# Patient Record
Sex: MALE | Race: WHITE | NOT HISPANIC OR LATINO
[De-identification: names, ages, dates, MRNs, and addresses within clinical notes are randomized per-mention and may not be internally consistent; named-entity substitution may affect disease eponyms.]

---

## 2018-07-17 ENCOUNTER — HOSPITAL (OUTPATIENT)
Dept: OTHER | Age: 63
End: 2018-07-17
Attending: OPHTHALMOLOGY

## 2024-08-09 PROCEDURE — 88305 TISSUE EXAM BY PATHOLOGIST: CPT | Performed by: DERMATOLOGY

## 2024-08-12 ENCOUNTER — LAB REQUISITION (OUTPATIENT)
Dept: LAB | Facility: HOSPITAL | Age: 69
End: 2024-08-12
Payer: MEDICARE

## 2024-08-12 DIAGNOSIS — C44.41 BASAL CELL CARCINOMA OF SKIN OF SCALP AND NECK: ICD-10-CM

## 2024-09-16 ENCOUNTER — TELEPHONE (OUTPATIENT)
Dept: FAMILY MEDICINE CLINIC | Facility: CLINIC | Age: 69
End: 2024-09-16

## 2024-09-16 NOTE — TELEPHONE ENCOUNTER
L4-5 Hemilaminotomy/Foraminotomy on 10/07/24 with Dr. Engel @ [facility]    H&P- Completed 09/17/2024 with Dr. Dagoberto Monae   Labs- Albumin (5.2), A/G ratio (2.1), MRSA neg, all other labs WNL  EKG- Sinus riri (56bpm) otherwise WNL  X-ray- Mild elevation of left hemidiaphragm. There is some mild linear opacity in the left lung base likely scarring vs atelectasis. Probable subcentimeter right upper lobe calcified granuloma. No pleural effusion or pneumothorax. No acute cardiopulmonary disease.     Cardiology- Dr. Almaguer @ [facility]  P- [phone]  F- [phone]  APPT 08/29/24  Cardiac Clx scanned into Media 09/18/24:  \"EKG is normal, should be good for surgery. Patient is stable cardiac wise for surgery.\"

## 2024-09-17 ENCOUNTER — EKG ENCOUNTER (OUTPATIENT)
Dept: LAB | Facility: HOSPITAL | Age: 69
End: 2024-09-17
Attending: FAMILY MEDICINE
Payer: MEDICARE

## 2024-09-17 ENCOUNTER — HOSPITAL ENCOUNTER (OUTPATIENT)
Dept: GENERAL RADIOLOGY | Facility: HOSPITAL | Age: 69
Discharge: HOME | End: 2024-09-17
Attending: FAMILY MEDICINE
Payer: MEDICARE

## 2024-09-17 ENCOUNTER — OFFICE VISIT (OUTPATIENT)
Dept: FAMILY MEDICINE CLINIC | Facility: CLINIC | Age: 69
End: 2024-09-17
Payer: MEDICARE

## 2024-09-17 VITALS
RESPIRATION RATE: 16 BRPM | TEMPERATURE: 97 F | SYSTOLIC BLOOD PRESSURE: 122 MMHG | BODY MASS INDEX: 32.58 KG/M2 | DIASTOLIC BLOOD PRESSURE: 82 MMHG | HEART RATE: 63 BPM | WEIGHT: 225 LBS | OXYGEN SATURATION: 98 % | HEIGHT: 69.5 IN

## 2024-09-17 DIAGNOSIS — K29.50 OTHER CHRONIC GASTRITIS WITHOUT HEMORRHAGE: ICD-10-CM

## 2024-09-17 DIAGNOSIS — Z01.812 PRE-OPERATIVE LABORATORY EXAMINATION: ICD-10-CM

## 2024-09-17 DIAGNOSIS — R06.02 SHORTNESS OF BREATH: ICD-10-CM

## 2024-09-17 DIAGNOSIS — N48.6 PEYRONIE'S DISEASE: ICD-10-CM

## 2024-09-17 DIAGNOSIS — E78.2 MIXED HYPERLIPIDEMIA: ICD-10-CM

## 2024-09-17 DIAGNOSIS — G47.33 OSA ON CPAP: ICD-10-CM

## 2024-09-17 DIAGNOSIS — E29.1 HYPOGONADISM IN MALE: ICD-10-CM

## 2024-09-17 DIAGNOSIS — J30.9 ALLERGIC SINUSITIS: ICD-10-CM

## 2024-09-17 DIAGNOSIS — I10 PRIMARY HYPERTENSION: ICD-10-CM

## 2024-09-17 DIAGNOSIS — R73.01 ELEVATED FASTING BLOOD SUGAR: ICD-10-CM

## 2024-09-17 DIAGNOSIS — Z01.818 PREOP EXAMINATION: ICD-10-CM

## 2024-09-17 DIAGNOSIS — I25.10 ATHEROSCLEROSIS OF NATIVE CORONARY ARTERY OF NATIVE HEART WITHOUT ANGINA PECTORIS: ICD-10-CM

## 2024-09-17 DIAGNOSIS — E03.9 ACQUIRED HYPOTHYROIDISM: ICD-10-CM

## 2024-09-17 DIAGNOSIS — M48.062 NEUROGENIC CLAUDICATION DUE TO LUMBAR SPINAL STENOSIS: Primary | ICD-10-CM

## 2024-09-17 LAB
ALBUMIN SERPL-MCNC: 5.2 G/DL (ref 3.2–4.8)
ALBUMIN/GLOB SERPL: 2.1 {RATIO} (ref 1–2)
ALP LIVER SERPL-CCNC: 61 U/L
ALT SERPL-CCNC: 27 U/L
ANION GAP SERPL CALC-SCNC: 5 MMOL/L (ref 0–18)
ANTIBODY SCREEN: NEGATIVE
APTT PPP: 27.5 SECONDS (ref 23–36)
AST SERPL-CCNC: 27 U/L (ref ?–34)
ATRIAL RATE: 56 BPM
BASOPHILS # BLD AUTO: 0.05 X10(3) UL (ref 0–0.2)
BASOPHILS NFR BLD AUTO: 1.1 %
BILIRUB SERPL-MCNC: 1 MG/DL (ref 0.2–1.1)
BILIRUB UR QL: NEGATIVE
BUN BLD-MCNC: 11 MG/DL (ref 9–23)
BUN/CREAT SERPL: 10.8 (ref 10–20)
CALCIUM BLD-MCNC: 10.3 MG/DL (ref 8.7–10.4)
CHLORIDE SERPL-SCNC: 105 MMOL/L (ref 98–112)
CLARITY UR: CLEAR
CO2 SERPL-SCNC: 29 MMOL/L (ref 21–32)
CREAT BLD-MCNC: 1.02 MG/DL
DEPRECATED RDW RBC AUTO: 45.7 FL (ref 35.1–46.3)
EGFRCR SERPLBLD CKD-EPI 2021: 79 ML/MIN/1.73M2 (ref 60–?)
EOSINOPHIL # BLD AUTO: 0.13 X10(3) UL (ref 0–0.7)
EOSINOPHIL NFR BLD AUTO: 2.8 %
ERYTHROCYTE [DISTWIDTH] IN BLOOD BY AUTOMATED COUNT: 13.9 % (ref 11–15)
EST. AVERAGE GLUCOSE BLD GHB EST-MCNC: 114 MG/DL (ref 68–126)
FASTING STATUS PATIENT QL REPORTED: YES
GLOBULIN PLAS-MCNC: 2.5 G/DL (ref 2–3.5)
GLUCOSE BLD-MCNC: 96 MG/DL (ref 70–99)
GLUCOSE UR-MCNC: NORMAL MG/DL
HBA1C MFR BLD: 5.6 % (ref ?–5.7)
HCT VFR BLD AUTO: 47.1 %
HGB BLD-MCNC: 15.7 G/DL
HGB UR QL STRIP.AUTO: NEGATIVE
IMM GRANULOCYTES # BLD AUTO: 0.01 X10(3) UL (ref 0–1)
IMM GRANULOCYTES NFR BLD: 0.2 %
INR BLD: 0.96 (ref 0.8–1.2)
KETONES UR-MCNC: NEGATIVE MG/DL
LEUKOCYTE ESTERASE UR QL STRIP.AUTO: NEGATIVE
LYMPHOCYTES # BLD AUTO: 1.32 X10(3) UL (ref 1–4)
LYMPHOCYTES NFR BLD AUTO: 28.9 %
MCH RBC QN AUTO: 29.9 PG (ref 26–34)
MCHC RBC AUTO-ENTMCNC: 33.3 G/DL (ref 31–37)
MCV RBC AUTO: 89.7 FL
MONOCYTES # BLD AUTO: 0.55 X10(3) UL (ref 0.1–1)
MONOCYTES NFR BLD AUTO: 12 %
NEUTROPHILS # BLD AUTO: 2.51 X10 (3) UL (ref 1.5–7.7)
NEUTROPHILS # BLD AUTO: 2.51 X10(3) UL (ref 1.5–7.7)
NEUTROPHILS NFR BLD AUTO: 55 %
NITRITE UR QL STRIP.AUTO: NEGATIVE
OSMOLALITY SERPL CALC.SUM OF ELEC: 287 MOSM/KG (ref 275–295)
P AXIS: 39 DEGREES
P-R INTERVAL: 154 MS
PH UR: 7 [PH] (ref 5–8)
PLATELET # BLD AUTO: 255 10(3)UL (ref 150–450)
POTASSIUM SERPL-SCNC: 4.5 MMOL/L (ref 3.5–5.1)
PROT SERPL-MCNC: 7.7 G/DL (ref 5.7–8.2)
PROT UR-MCNC: NEGATIVE MG/DL
PROTHROMBIN TIME: 13.4 SECONDS (ref 11.6–14.8)
Q-T INTERVAL: 396 MS
QRS DURATION: 94 MS
QTC CALCULATION (BEZET): 382 MS
R AXIS: 17 DEGREES
RBC # BLD AUTO: 5.25 X10(6)UL
RH BLOOD TYPE: POSITIVE
SODIUM SERPL-SCNC: 139 MMOL/L (ref 136–145)
SP GR UR STRIP: 1.01 (ref 1–1.03)
T AXIS: 28 DEGREES
UROBILINOGEN UR STRIP-ACNC: NORMAL
VENTRICULAR RATE: 56 BPM
WBC # BLD AUTO: 4.6 X10(3) UL (ref 4–11)

## 2024-09-17 PROCEDURE — 86900 BLOOD TYPING SEROLOGIC ABO: CPT | Performed by: FAMILY MEDICINE

## 2024-09-17 PROCEDURE — 87081 CULTURE SCREEN ONLY: CPT

## 2024-09-17 PROCEDURE — 99204 OFFICE O/P NEW MOD 45 MIN: CPT | Performed by: FAMILY MEDICINE

## 2024-09-17 PROCEDURE — 86850 RBC ANTIBODY SCREEN: CPT | Performed by: FAMILY MEDICINE

## 2024-09-17 PROCEDURE — 81003 URINALYSIS AUTO W/O SCOPE: CPT

## 2024-09-17 PROCEDURE — 93005 ELECTROCARDIOGRAM TRACING: CPT

## 2024-09-17 PROCEDURE — 93010 ELECTROCARDIOGRAM REPORT: CPT | Performed by: STUDENT IN AN ORGANIZED HEALTH CARE EDUCATION/TRAINING PROGRAM

## 2024-09-17 PROCEDURE — 71046 X-RAY EXAM CHEST 2 VIEWS: CPT | Performed by: FAMILY MEDICINE

## 2024-09-17 PROCEDURE — 99499 UNLISTED E&M SERVICE: CPT | Performed by: FAMILY MEDICINE

## 2024-09-17 PROCEDURE — 85730 THROMBOPLASTIN TIME PARTIAL: CPT

## 2024-09-17 PROCEDURE — 86901 BLOOD TYPING SEROLOGIC RH(D): CPT | Performed by: FAMILY MEDICINE

## 2024-09-17 PROCEDURE — 85610 PROTHROMBIN TIME: CPT

## 2024-09-17 PROCEDURE — 80053 COMPREHEN METABOLIC PANEL: CPT

## 2024-09-17 PROCEDURE — 83036 HEMOGLOBIN GLYCOSYLATED A1C: CPT

## 2024-09-17 PROCEDURE — 36415 COLL VENOUS BLD VENIPUNCTURE: CPT

## 2024-09-17 PROCEDURE — 85025 COMPLETE CBC W/AUTO DIFF WBC: CPT

## 2024-09-17 RX ORDER — SILDENAFIL CITRATE 20 MG/1
20 TABLET ORAL AS NEEDED
COMMUNITY

## 2024-09-17 RX ORDER — IBUPROFEN 200 MG
400 TABLET ORAL EVERY 6 HOURS PRN
COMMUNITY

## 2024-09-17 RX ORDER — CARVEDILOL 3.12 MG/1
3.12 TABLET ORAL 2 TIMES DAILY WITH MEALS
COMMUNITY

## 2024-09-17 RX ORDER — EZETIMIBE 10 MG/1
10 TABLET ORAL NIGHTLY
COMMUNITY

## 2024-09-17 RX ORDER — PENTOXIFYLLINE 400 MG/1
400 TABLET, EXTENDED RELEASE ORAL 2 TIMES DAILY
COMMUNITY

## 2024-09-17 RX ORDER — LISINOPRIL 10 MG/1
10 TABLET ORAL DAILY
COMMUNITY

## 2024-09-17 RX ORDER — TESTOSTERONE GEL, 1% 10 MG/G
4 GEL TRANSDERMAL DAILY
COMMUNITY

## 2024-09-17 RX ORDER — LEVOTHYROXINE SODIUM 75 UG/1
75 TABLET ORAL
COMMUNITY

## 2024-09-17 RX ORDER — FAMOTIDINE 40 MG/1
40 TABLET, FILM COATED ORAL DAILY
COMMUNITY

## 2024-09-17 RX ORDER — ROSUVASTATIN CALCIUM 40 MG/1
40 TABLET, COATED ORAL DAILY
COMMUNITY

## 2024-09-17 RX ORDER — FLUTICASONE PROPIONATE 50 MCG
1 SPRAY, SUSPENSION (ML) NASAL DAILY
COMMUNITY

## 2024-09-17 NOTE — H&P
[facility] PRE-OP CLINIC [place]    PRE-OP NOTE    HPI:   I have been consulted by Dr. Engel to see Anthony Cantu 68 year old male for a preoperative evaluation and medical clearance. He has a long history of worsening severe degenerative arthritis and lumbar spinal stenosis . Patient is to have a L4-L5 hemilaminotomy and foraminotomy  by Dr. Engel  on 10/7/2024.     Pt suffers significant pain and loss of function.     He has no cardiopulmonary symptoms.      He has no history of DVT. Denies tobacco use.       Family History   Problem Relation Age of Onset    Other (Other) Brother         MI      No current outpatient medications on file.     Past Medical History:    Acute lower GI hemorrhage    post colonoscopy and polypectomy, recieved 2 blood trasfusions- no reaction    Allergic rhinitis    C. difficile colitis    CAD (coronary artery disease)    s/p angioplasty and stent    COVID    Essential hypertension    Gastritis    Hyperlipidemia    Hypogonadism male    Hypothyroidism    Lumbar stenosis    Peyronie's syndrome    Rheumatic fever    no valvular heart involment    Sleep apnea    uses CPAP     Past Surgical History:   Procedure Laterality Date    Angioplasty (coronary)      with 2 stents placed 2005, 2008    Cataract Bilateral     Colonoscopy  2014    with polypectomy    Lumbar transforaminal epidural injection (eeh)      2019, 2024    Repair retinal detach, cplx       Social History     Socioeconomic History    Marital status:      Spouse name: Not on file    Number of children: Not on file    Years of education: Not on file    Highest education level: Not on file   Occupational History    Not on file   Tobacco Use    Smoking status: Never     Passive exposure: Past    Smokeless tobacco: Never   Vaping Use    Vaping status: Never Used   Substance and Sexual Activity    Alcohol use: Yes     Comment: 10 drinks per week    Drug use: Never    Sexual activity: Not on file   Other Topics Concern     Not on file   Social History Narrative    Not on file     Social Determinants of Health     Financial Resource Strain: Not on file   Food Insecurity: Not on file   Transportation Needs: Not on file   Physical Activity: Not on file   Stress: Not on file   Social Connections: Not on file   Housing Stability: Not on file       REVIEW OF SYSTEMS:   CONSTITUTIONAL:  Denies unusual weight gain/loss, fever, chills  EENT:  Eyes:  Denies eye pain, visual loss, blurred vision, double vision. Ears, Nose, Throat:  Denies congestion, runny nose or sore throat.  INTEGUMENTARY:  Denies rashes, itching, skin lesion,   CARDIOVASCULAR:  Denies DVT. Denies chest pain, palpitations, edema, dyspnea  RESPIRATORY: Denies  CHELY ,Denies shortness of breath, wheezing, cough  GASTROINTESTINAL:  Denies abdominal pain, nausea, vomiting, constipation, diarrhea, or blood in stool.  MUSCULOSKELETAL: severe pain to his low back radiating to his right leg as noted above  NEUROLOGICAL:  Denies headache, seizures, dizziness, syncope, paralysis, ataxia,  HEMATOLOGIC:  Denies anemia, bleeding or bruising.  LYMPHATICS:  Denies enlarged nodes   PSYCHIATRIC:  Denies depression or anxiety.  ENDOCRINOLOGIC: DM 2 NO,   ALLERGIES:  Denies allergic response, history of asthma, hives,     EXAM:   /82 (BP Location: Left arm)   Pulse 63   Temp 97 °F (36.1 °C) (Temporal)   Resp 16   Ht 5' 9.5\" (1.765 m)   Wt 225 lb (102.1 kg)   SpO2 98%   BMI 32.75 kg/m²  Estimated body mass index is 32.75 kg/m² as calculated from the following:    Height as of this encounter: 5' 9.5\" (1.765 m).    Weight as of this encounter: 225 lb (102.1 kg).   Vital signs reviewed.Appears stated age, well groomed.  Physical Exam:  GEN:  Patient is alert, awake and oriented, well developed, well nourished, no apparent distress.  HEENT:  Head:  Normocephalic, atraumatic Nose: patent, no nasal discharge  NECK: Supple, no CLAD, no carotid bruit, no thyromegaly.  SKIN: No rashes, no skin  lesion, no bruising, good turgor.  HEART:  Regular rate and rhythm, no murmurs, rubs or gallops.  LUNGS: Clear to auscultation bilterally, no rales/rhonchi/wheezing.  CHEST: No tenderness.  ABDOMEN:  Soft, nondistended, nontender,  no masses, no hepatosplenomegaly.  BACK: low lumbar spinal tenderness ,   EXTREMITIES:  No edema, no cyanosis, no clubbing,   NEURO:  No focal deficit, speech fluent, he has an antalgic  gait, strength and tone, sensory intact      No results found for: \"WBC\", \"HGB\", \"HCT\", \"PLT\", \"CREATSERUM\", \"BUN\", \"NA\", \"K\", \"CL\", \"CO2\", \"GLU\", \"CA\", \"ALB\", \"ALKPHO\", \"BILT\", \"TP\", \"AST\", \"ALT\", \"PTT\", \"INR\", \"PT\", \"T4F\", \"TSH\", \"TSHREFLEX\", \"ANGÉLICA\", \"LIP\", \"GGT\", \"PSA\", \"DDIMER\", \"ESRML\", \"ESRPF\", \"CRP\", \"BNP\", \"MG\", \"PHOS\", \"TROP\", \"CK\", \"CKMB\", \"RANULFO\", \"RPR\", \"B12\", \"ETOH\", \"POCGLU\"    No results found.          ASSESSMENT AND PLAN:   Anthony Cantu is a 68 year old male, with a hx of severe degenerative arthritis and lumbar spinal stenosis  who presents for a pre-operative physical exam. Patient is to have a L4-L5 hemilamotomy and foraminotomy  by Dr. Engel  on 10/7/2024.      ICD-10-CM    1. Neurogenic claudication due to lumbar spinal stenosis  M48.062       2. Allergic sinusitis  J30.9       3. Atherosclerosis of native coronary artery of native heart without angina pectoris  I25.10       4. BMI 32.0-32.9,adult  Z68.32       5. Primary hypertension  I10       6. Other chronic gastritis without hemorrhage  K29.50       7. Acquired hypothyroidism  E03.9       8. Mixed hyperlipidemia  E78.2       9. Hypogonadism in male  E29.1       10. Peyronie's disease  N48.6       11. CHELY on CPAP  G47.33       12. Shortness of breath  R06.02 CBC With Differential With Platelet     Comp Metabolic Panel (14)     EKG 12 Lead     MSSA and MRSA Culture Screen     Hemoglobin A1C     Prothrombin Time (PT)     PTT, Activated     Type and screen     Urinalysis with Culture Reflex     XR CHEST PA + LAT CHEST  (CPT=71046)      13. Elevated fasting blood sugar  R73.01 CBC With Differential With Platelet     Comp Metabolic Panel (14)     EKG 12 Lead     MSSA and MRSA Culture Screen     Hemoglobin A1C     Prothrombin Time (PT)     PTT, Activated     Type and screen     Urinalysis with Culture Reflex     XR CHEST PA + LAT CHEST (CPT=71046)      14. Preop examination  Z01.818 CBC With Differential With Platelet     Comp Metabolic Panel (14)     EKG 12 Lead     MSSA and MRSA Culture Screen     Hemoglobin A1C     Prothrombin Time (PT)     PTT, Activated     Type and screen     Urinalysis with Culture Reflex     XR CHEST PA + LAT CHEST (CPT=71046)      15. Pre-operative laboratory examination  Z01.812 CBC With Differential With Platelet     Comp Metabolic Panel (14)     EKG 12 Lead     MSSA and MRSA Culture Screen     Hemoglobin A1C     Prothrombin Time (PT)     PTT, Activated     Type and screen     Urinalysis with Culture Reflex     XR CHEST PA + LAT CHEST (CPT=71046)         ECG and labs are pending review     Preoperative Risk Stratification: There are no decompensated medical conditions. ASA classification 3    Medical history:  High risk surgery (vascular, thoracic, intra-peritoneal): No  CAD: YES  CHF:   Stroke: No  DM on insulin: No  Serum Creatinine >2 mg/dl: No  Patient has an RCRI score that is moderate risk and is at moderate risk for major cardiac event in the perioperative period.     Patient is medically optimized and has an acceptable risk of surgery and may proceed with surgery as planned.     PLAN:    Await Cardiology recommendations     Patient to discontinue medications and supplements with anticoagulation properties as per instruction.        Postoperative Recommendations:    Anticoagulation / DVT prophylaxis: SCDs, early ambulation.   GI protection: Protonix  Incentive Spirometry   Telemetry as needed  CPAP/O2 as needed   DM: QID glucoscans and sliding scale insulin as needed   Renal protection: (hydration /  NSAID and ACE/ARB avoidance)   Cognitive protection: (minimize narcotics, benzodiazepines, scopolamine)     Pain management and Physical therapy as per Orthopedic service.   Home Health as needed    Thank you for the opportunity to care for your patient and to assist in managing the postoperative course.        Dagoberto Monae DO  9/17/2024  10:24 AM

## 2024-09-17 NOTE — TELEPHONE ENCOUNTER
Dr. Monae, please review:    Labs- Albumin (5.2), A/G ratio (2.1), MRSA neg, all other labs WNL    EKG- Sinus riri (56bpm) otherwise WNL    X-ray- Mild elevation of left hemidiaphragm. There is some mild linear opacity in the left lung base likely scarring vs atelectasis. Probable subcentimeter right upper lobe calcified granuloma. No pleural effusion or pneumothorax. No acute cardiopulmonary disease.     Cardiology- Dr. Almaguer @ [facility]  Cardiac Clx scanned into [facility] 09/18/24:  \"EKG is normal, should be good for surgery. Patient is stable cardiac wise for surgery.\"    OK for surgery?

## 2024-10-01 PROBLEM — N48.6 PEYRONIE DISEASE: Status: ACTIVE | Noted: 2019-06-13

## 2024-10-01 PROBLEM — M48.061 LUMBAR STENOSIS: Status: ACTIVE | Noted: 2024-10-01

## 2024-10-01 PROBLEM — E78.5 HYPERLIPIDEMIA: Status: ACTIVE | Noted: 2024-10-01

## 2024-10-04 NOTE — DISCHARGE INSTRUCTIONS
ALONA GONZALEZ M.D    LUMBAR DECOMPRESSION DISCHARGE INSTRUCTIONS  Hold vitamins/supplements/herbals for one week post op  GENERAL  Swelling is commonly experienced by patients after surgery around the surgical incision.   You may apply an icepack to the incision. Make sure to place a barrier between your skin & the ice and keep your dressing dry.  MEDICATIONS  Your post-op pain medications were sent to your pharmacy prior to surgery.  Begin taking your pain medications once you get home, as prescribed.   Please allow 48 hours for refills. Call our office at [phone] to request a refill.  Caution: Narcotics are habit forming and have multiple side effects. Begin to taper your use as soon as you are able.  If you were on a blood thinner before surgery, you may resume 3 days after surgery (unless otherwise directed by your prescribing MD). Do not take anti-inflammatories if you are on a blood thinner).  You may resume anti-inflammatories (i.e. Advil, Aleve, Motrin) 1 week after surgery (do not take if on blood thinner).  SURGICAL DRESSING  Remove your dressings(s) three (3) days after surgery.   Do not apply any ointments to the surgical incision. All the sutures we use are absorbed underneath your skin. Skin glue is applied to the superficial layers to keep the skin edges approximated.  You may shower after 3 days, allowing soap and water to run over the incision & pat dry. Do not submerge your incision(s) in water.  ACTIVITY/ SLEEPING  Avoid excessive bending, lifting & twisting at the waist. You may twist enough to use the bathroom for hygiene purposes.   Do not lift more than 10-15 pounds for the first 6 weeks after surgery.  When sleeping, keep your spine in a neutral position. Avoid twisting when getting into or out of your sleeping position.     DIET  Stay hydrated & eat plenty of fiber-rich foods, fresh fruits, and vegetables. Narcotics are constipating!  Make sure to take the stool softener  (Senakot-S) twice daily to avoid constipation.  If the Senakot is not effective in producing a bowel movement, obtain a laxative such as Magnesium Citrate, MiraLAX, or Dulcolax at your local pharmacy. Drinking prune juice can often be helpful, as well.  CONCERNING FINDINGS  Call our office if you have any concerns  Concerning findings: Excessive redness of the incision(s), drainage for more than 4 days after surgery, fever of more than 101.5° F  Business Hours (8-5 pm): [phone]  After-hours: Contact the on-call orthopedic physician at:  [phone], option [phone], and ask for the orthopedic spine fellow on call  FOLLOW-UP  You will be seen by one of Dr Engel' PA's or NP's for your first post-op visit, 2-3 weeks after surgery.  If your post-op visits are not already scheduled, please call our office to schedule.  Administrative needs: [phone]  Clinical questions (business hours only): [phone]    **REFER TO THE FAQ PACKET FOR MORE DETAILED INFORMATION ABOUT YOUR PROCEDURE, RECOVERY, AND COMMONLY ASKED QUESTIONS/ANSWERS**    POST-OP MEDICATION SCHEDULE 24-hour regime example  OXYCODONE prescription  Medication 6 pm 8 pm 10 pm 12 am 2 am 4 am 6am 8 am 10 am 12 pm 2 pm 4 pm   Oxycodone 5 mg 1-2 tabs  1-2 tabs  1-2 tabs  1-2 tabs  1-2 tabs  1-2 tabs    Tylenol 500 mg  2 tabs    2 tabs    2 tabs     Tizanidine 4 mg 1 tab    1 tab    1 tab      Senokot 8.6 mg 1 tab      1 tab        Cefadroxil 500 mg  1 tab      1 tab       **Tylenol is OTC & optional  Oxycodone 5 mg OR Norco 5/325 mg  Strong pain medication. Take 1-2 tablets by mouth every 4-6 hours after surgery as needed for pain. **Begin with 1 tablet and increase to 2 tablets if pain not controlled.  This medication is to be taken as needed. Narcotics can make you constipated!  Tizanidine 4 mg  Muscle relaxer. Take 1 tablet every 8 hours as needed for muscle spasms. This medication may make you very tired.  Cefadroxil 500 mg  Antibiotic that must be taken  for 5 days after surgery.   Senokot 8.6 mg  Stool softener. Take 1 tablet twice daily (every 12 hours) while taking narcotic pain medication. This medication is to be taken as needed.  **PLEASE NOTE: This is an example for your convenience. As your pain improves, you should wean off narcotic pain medications.  Narcotic pain medications will not be prescribed longer than 12 weeks.      HOME INSTRUCTIONS  AMBSURG HOME CARE INSTRUCTIONS: POST-OP ANESTHESIA  The medication that you received for sedation or general anesthesia can last up to 24 hours. Your judgment and reflexes may be altered, even if you feel like your normal self.      We Recommend:   Do not drive any motor vehicle or bicycle   Avoid mowing the lawn, playing sports, or working with power tools/applicances (power saws, electric knives or mixers)   That you have someone stay with you on your first night home   Do not drink alcohol or take sleeping pills or tranquilizers   Do not sign legal documents within 24 hours of your procedure   If you had a nerve block for your surgery, take extra care not to put any pressure on your arm or hand for 24 hours    It is normal:  For you to have a sore throat if you had a breathing tube during surgery (while you were asleep!). The sore throat should get better within 48 hours. You can gargle with warm salt water (1/2 tsp in 4 oz warm water) or use a throat lozenge for comfort  To feel muscle aches or soreness especially in the abdomen, chest or neck. The achy feeling should go away in the next 24 hours  To feel weak, sleepy or \"wiped out\". Your should start feeling better in the next 24 hours.   To experience mild discomforts such as sore lip or tongue, headache, cramps, gas pains or a bloated feeling in your abdomen.   To experience mild back pain or soreness for a day or two if you had spinal or epidural anesthesia.   If you had laparoscopic surgery, to feel shoulder pain or discomfort on the day of surgery.   For  some patients to have nausea after surgery/anesthesia    If you feel nausea or experience vomiting:   Try to move around less.   Eat less than usual or drink only liquids until the next morning   Nausea should resolve in about 24 hours    If you have a problem when you are at home:    Call your surgeons office   Discharge Instructions: After Your Surgery  You’ve just had surgery. During surgery, you were given medicine called anesthesia to keep you relaxed and free of pain. After surgery, you may have some pain or nausea. This is common. Here are some tips for feeling better and getting well after surgery.   Going home  Your healthcare provider will show you how to take care of yourself when you go home. They'll also answer your questions. Have an adult family member or friend drive you home. For the first 24 hours after your surgery:   Don't drive or use heavy equipment.  Don't make important decisions or sign legal papers.  Take medicines as directed.  Don't drink alcohol.  Have someone stay with you, if needed. They can watch for problems and help keep you safe.  Be sure to go to all follow-up visits with your healthcare provider. And rest after your surgery for as long as your provider tells you to.   Coping with pain  If you have pain after surgery, pain medicine will help you feel better. Take it as directed, before pain becomes severe. Also, ask your healthcare provider or pharmacist about other ways to control pain. This might be with heat, ice, or relaxation. And follow any other instructions your surgeon or nurse gives you.      Stay on schedule with your medicine.     Tips for taking pain medicine  To get the best relief possible, remember these points:   Pain medicines can upset your stomach. Taking them with a little food may help.  Most pain relievers taken by mouth need at least 20 to 30 minutes to start to work.  Don't wait till your pain becomes severe before you take your medicine. Try to time your  medicine so that you can take it before starting an activity. This might be before you get dressed, go for a walk, or sit down for dinner.  Constipation is a common side effect of some pain medicines. Call your healthcare provider before taking any medicines such as laxatives or stool softeners to help ease constipation. Also ask if you should skip any foods. Drinking lots of fluids and eating foods such as fruits and vegetables that are high in fiber can also help. Remember, don't take laxatives unless your surgeon has prescribed them.  Drinking alcohol and taking pain medicine can cause dizziness and slow your breathing. It can even be deadly. Don't drink alcohol while taking pain medicine.  Pain medicine can make you react more slowly to things. Don't drive or run machinery while taking pain medicine.  Your healthcare provider may tell you to take acetaminophen to help ease your pain. Ask them how much you're supposed to take each day. Acetaminophen or other pain relievers may interact with your prescription medicines or other over-the-counter (OTC) medicines. Some prescription medicines have acetaminophen and other ingredients in them. Using both prescription and OTC acetaminophen for pain can cause you to accidentally overdose. Read the labels on your OTC medicines with care. This will help you to clearly know the list of ingredients, how much to take, and any warnings. It may also help you not take too much acetaminophen. If you have questions or don't understand the information, ask your pharmacist or healthcare provider to explain it to you before you take the OTC medicine.   Managing nausea  Some people have an upset stomach (nausea) after surgery. This is often because of anesthesia, pain, or pain medicine, less movement of food in the stomach, or the stress of surgery. These tips will help you handle nausea and eat healthy foods as you get better. If you were on a special food plan before surgery, ask  your healthcare provider if you should follow it while you get better. Check with your provider on how your eating should progress. It may depend on the surgery you had. These general tips may help:   Don't push yourself to eat. Your body will tell you when to eat and how much.  Start off with clear liquids and soup. They're easier to digest.  Next try semi-solid foods as you feel ready. These include mashed potatoes, applesauce, and gelatin.  Slowly move to solid foods. Don’t eat fatty, rich, or spicy foods at first.  Don't force yourself to have 3 large meals a day. Instead eat smaller amounts more often.  Take pain medicines with a small amount of solid food, such as crackers or toast. This helps prevent nausea.  When to call your healthcare provider  Call your healthcare provider right away if you have any of these:   You still have too much pain, or the pain gets worse, after taking the medicine. The medicine may not be strong enough. Or there may be a complication from the surgery.  You feel too sleepy, dizzy, or groggy. The medicine may be too strong.  Side effects such as nausea or vomiting. Your healthcare provider may advise taking other medicines to .  Skin changes such as rash, itching, or hives. This may mean you have an allergic reaction. Your provider may advise taking other medicines.  The incision looks different (for instance, part of it opens up).  Bleeding or fluid leaking from the incision site, and weren't told to expect that.  Fever of 100.4°F (38°C) or higher, or as directed by your provider.  Call 911  Call 911 right away if you have:   Trouble breathing  Facial swelling    If you have obstructive sleep apnea   You were given anesthesia medicine during surgery to keep you comfortable and free of pain. After surgery, you may have more apnea spells because of this medicine and other medicines you were given. The spells may last longer than normal.    At home:  Keep using the continuous  positive airway pressure (CPAP) device when you sleep. Unless your healthcare provider tells you not to, use it when you sleep, day or night. CPAP is a common device used to treat obstructive sleep apnea.  Talk with your provider before taking any pain medicine, muscle relaxants, or sedatives. Your provider will tell you about the possible dangers of taking these medicines.  Contact your provider if your sleeping changes a lot even when taking medicines as directed.  Mandeep last reviewed this educational content on 10/1/2021  © 1760-3349 [employer]. All rights reserved. This information is not intended as a substitute for professional medical care. Always follow your healthcare professional's instructions.

## 2024-10-07 ENCOUNTER — ANESTHESIA EVENT (OUTPATIENT)
Dept: SURGERY | Facility: HOSPITAL | Age: 69
End: 2024-10-07
Payer: MEDICARE

## 2024-10-07 ENCOUNTER — APPOINTMENT (OUTPATIENT)
Dept: GENERAL RADIOLOGY | Facility: HOSPITAL | Age: 69
End: 2024-10-07
Attending: ORTHOPAEDIC SURGERY
Payer: MEDICARE

## 2024-10-07 ENCOUNTER — HOSPITAL ENCOUNTER (OUTPATIENT)
Facility: HOSPITAL | Age: 69
Setting detail: HOSPITAL OUTPATIENT SURGERY
Discharge: HOME | End: 2024-10-07
Attending: ORTHOPAEDIC SURGERY | Admitting: ORTHOPAEDIC SURGERY
Payer: MEDICARE

## 2024-10-07 ENCOUNTER — ANESTHESIA (OUTPATIENT)
Dept: SURGERY | Facility: HOSPITAL | Age: 69
End: 2024-10-07
Payer: MEDICARE

## 2024-10-07 VITALS
WEIGHT: 232.19 LBS | HEIGHT: 69.5 IN | HEART RATE: 54 BPM | DIASTOLIC BLOOD PRESSURE: 71 MMHG | BODY MASS INDEX: 33.62 KG/M2 | OXYGEN SATURATION: 96 % | RESPIRATION RATE: 16 BRPM | SYSTOLIC BLOOD PRESSURE: 130 MMHG | TEMPERATURE: 98 F

## 2024-10-07 LAB — RH BLOOD TYPE: POSITIVE

## 2024-10-07 PROCEDURE — 00NY0ZZ RELEASE LUMBAR SPINAL CORD, OPEN APPROACH: ICD-10-PCS | Performed by: ORTHOPAEDIC SURGERY

## 2024-10-07 PROCEDURE — 76000 FLUOROSCOPY <1 HR PHYS/QHP: CPT | Performed by: ORTHOPAEDIC SURGERY

## 2024-10-07 RX ORDER — FAMOTIDINE 20 MG/1
20 TABLET, FILM COATED ORAL ONCE
Status: COMPLETED | OUTPATIENT
Start: 2024-10-07 | End: 2024-10-07

## 2024-10-07 RX ORDER — NALOXONE HYDROCHLORIDE 0.4 MG/ML
0.08 INJECTION, SOLUTION INTRAMUSCULAR; INTRAVENOUS; SUBCUTANEOUS AS NEEDED
Status: DISCONTINUED | OUTPATIENT
Start: 2024-10-07 | End: 2024-10-07

## 2024-10-07 RX ORDER — METHOCARBAMOL 100 MG/ML
1000 INJECTION, SOLUTION INTRAMUSCULAR; INTRAVENOUS ONCE
Status: DISCONTINUED | OUTPATIENT
Start: 2024-10-07 | End: 2024-10-07

## 2024-10-07 RX ORDER — ONDANSETRON 2 MG/ML
4 INJECTION INTRAMUSCULAR; INTRAVENOUS EVERY 6 HOURS PRN
Status: DISCONTINUED | OUTPATIENT
Start: 2024-10-07 | End: 2024-10-07

## 2024-10-07 RX ORDER — METHYLPREDNISOLONE ACETATE 40 MG/ML
INJECTION, SUSPENSION INTRA-ARTICULAR; INTRALESIONAL; INTRAMUSCULAR; SOFT TISSUE AS NEEDED
Status: DISCONTINUED | OUTPATIENT
Start: 2024-10-07 | End: 2024-10-07 | Stop reason: HOSPADM

## 2024-10-07 RX ORDER — SODIUM CHLORIDE, SODIUM LACTATE, POTASSIUM CHLORIDE, CALCIUM CHLORIDE 600; 310; 30; 20 MG/100ML; MG/100ML; MG/100ML; MG/100ML
INJECTION, SOLUTION INTRAVENOUS CONTINUOUS
Status: DISCONTINUED | OUTPATIENT
Start: 2024-10-07 | End: 2024-10-07

## 2024-10-07 RX ORDER — METOCLOPRAMIDE HYDROCHLORIDE 5 MG/ML
10 INJECTION INTRAMUSCULAR; INTRAVENOUS ONCE
Status: COMPLETED | OUTPATIENT
Start: 2024-10-07 | End: 2024-10-07

## 2024-10-07 RX ORDER — OXYCODONE HYDROCHLORIDE 5 MG/1
10 TABLET ORAL ONCE
Status: COMPLETED | OUTPATIENT
Start: 2024-10-07 | End: 2024-10-07

## 2024-10-07 RX ORDER — FAMOTIDINE 10 MG/ML
20 INJECTION, SOLUTION INTRAVENOUS ONCE
Status: COMPLETED | OUTPATIENT
Start: 2024-10-07 | End: 2024-10-07

## 2024-10-07 RX ORDER — LIDOCAINE HYDROCHLORIDE 10 MG/ML
INJECTION, SOLUTION EPIDURAL; INFILTRATION; INTRACAUDAL; PERINEURAL AS NEEDED
Status: DISCONTINUED | OUTPATIENT
Start: 2024-10-07 | End: 2024-10-07 | Stop reason: SURG

## 2024-10-07 RX ORDER — OXYCODONE HYDROCHLORIDE 5 MG/1
10 TABLET ORAL EVERY 4 HOURS PRN
Status: DISCONTINUED | OUTPATIENT
Start: 2024-10-07 | End: 2024-10-07

## 2024-10-07 RX ORDER — ONDANSETRON 2 MG/ML
INJECTION INTRAMUSCULAR; INTRAVENOUS AS NEEDED
Status: DISCONTINUED | OUTPATIENT
Start: 2024-10-07 | End: 2024-10-07 | Stop reason: SURG

## 2024-10-07 RX ORDER — DEXAMETHASONE SODIUM PHOSPHATE 4 MG/ML
VIAL (ML) INJECTION AS NEEDED
Status: DISCONTINUED | OUTPATIENT
Start: 2024-10-07 | End: 2024-10-07 | Stop reason: SURG

## 2024-10-07 RX ORDER — METOCLOPRAMIDE 10 MG/1
10 TABLET ORAL ONCE
Status: COMPLETED | OUTPATIENT
Start: 2024-10-07 | End: 2024-10-07

## 2024-10-07 RX ORDER — MIDAZOLAM HYDROCHLORIDE 1 MG/ML
INJECTION INTRAMUSCULAR; INTRAVENOUS AS NEEDED
Status: DISCONTINUED | OUTPATIENT
Start: 2024-10-07 | End: 2024-10-07 | Stop reason: SURG

## 2024-10-07 RX ORDER — MORPHINE SULFATE 10 MG/ML
6 INJECTION, SOLUTION INTRAMUSCULAR; INTRAVENOUS EVERY 10 MIN PRN
Status: DISCONTINUED | OUTPATIENT
Start: 2024-10-07 | End: 2024-10-07

## 2024-10-07 RX ORDER — HYDROMORPHONE HYDROCHLORIDE 1 MG/ML
0.6 INJECTION, SOLUTION INTRAMUSCULAR; INTRAVENOUS; SUBCUTANEOUS EVERY 5 MIN PRN
Status: DISCONTINUED | OUTPATIENT
Start: 2024-10-07 | End: 2024-10-07

## 2024-10-07 RX ORDER — ACETAMINOPHEN 500 MG
1000 TABLET ORAL ONCE
Status: COMPLETED | OUTPATIENT
Start: 2024-10-07 | End: 2024-10-07

## 2024-10-07 RX ORDER — HYDROMORPHONE HYDROCHLORIDE 1 MG/ML
0.4 INJECTION, SOLUTION INTRAMUSCULAR; INTRAVENOUS; SUBCUTANEOUS EVERY 5 MIN PRN
Status: DISCONTINUED | OUTPATIENT
Start: 2024-10-07 | End: 2024-10-07

## 2024-10-07 RX ORDER — HYDROMORPHONE HYDROCHLORIDE 1 MG/ML
0.2 INJECTION, SOLUTION INTRAMUSCULAR; INTRAVENOUS; SUBCUTANEOUS EVERY 5 MIN PRN
Status: DISCONTINUED | OUTPATIENT
Start: 2024-10-07 | End: 2024-10-07

## 2024-10-07 RX ORDER — MORPHINE SULFATE 4 MG/ML
2 INJECTION, SOLUTION INTRAMUSCULAR; INTRAVENOUS EVERY 10 MIN PRN
Status: DISCONTINUED | OUTPATIENT
Start: 2024-10-07 | End: 2024-10-07

## 2024-10-07 RX ORDER — MORPHINE SULFATE 4 MG/ML
4 INJECTION, SOLUTION INTRAMUSCULAR; INTRAVENOUS EVERY 10 MIN PRN
Status: DISCONTINUED | OUTPATIENT
Start: 2024-10-07 | End: 2024-10-07

## 2024-10-07 RX ORDER — OXYCODONE HYDROCHLORIDE 5 MG/1
5 TABLET ORAL EVERY 4 HOURS PRN
Status: DISCONTINUED | OUTPATIENT
Start: 2024-10-07 | End: 2024-10-07

## 2024-10-07 RX ORDER — ROCURONIUM BROMIDE 10 MG/ML
INJECTION, SOLUTION INTRAVENOUS AS NEEDED
Status: DISCONTINUED | OUTPATIENT
Start: 2024-10-07 | End: 2024-10-07 | Stop reason: SURG

## 2024-10-07 RX ORDER — METOPROLOL TARTRATE 25 MG/1
25 TABLET, FILM COATED ORAL ONCE AS NEEDED
Status: DISCONTINUED | OUTPATIENT
Start: 2024-10-07 | End: 2024-10-07 | Stop reason: HOSPADM

## 2024-10-07 RX ORDER — BUPIVACAINE HYDROCHLORIDE AND EPINEPHRINE 5; 5 MG/ML; UG/ML
INJECTION, SOLUTION PERINEURAL AS NEEDED
Status: DISCONTINUED | OUTPATIENT
Start: 2024-10-07 | End: 2024-10-07 | Stop reason: HOSPADM

## 2024-10-07 RX ADMIN — ONDANSETRON 4 MG: 2 INJECTION INTRAMUSCULAR; INTRAVENOUS at 07:29:00

## 2024-10-07 RX ADMIN — DEXAMETHASONE SODIUM PHOSPHATE 4 MG: 4 MG/ML VIAL (ML) INJECTION at 07:28:00

## 2024-10-07 RX ADMIN — MIDAZOLAM HYDROCHLORIDE 2 MG: 1 INJECTION INTRAMUSCULAR; INTRAVENOUS at 07:17:00

## 2024-10-07 RX ADMIN — ROCURONIUM BROMIDE 50 MG: 10 INJECTION, SOLUTION INTRAVENOUS at 07:23:00

## 2024-10-07 RX ADMIN — LIDOCAINE HYDROCHLORIDE 50 MG: 10 INJECTION, SOLUTION EPIDURAL; INFILTRATION; INTRACAUDAL; PERINEURAL at 07:20:00

## 2024-10-07 RX ADMIN — SODIUM CHLORIDE, SODIUM LACTATE, POTASSIUM CHLORIDE, CALCIUM CHLORIDE: 600; 310; 30; 20 INJECTION, SOLUTION INTRAVENOUS at 08:11:00

## 2024-10-07 NOTE — ANESTHESIA PROCEDURE NOTES
Airway  Date/Time: 10/7/2024 7:24 AM  Urgency: Elective    Airway not difficult    General Information and Staff    Patient location during procedure: OR  Anesthesiologist: Ema Ramos MD  Performed: anesthesiologist   Performed by: Ema Ramos MD  Authorized by: Ema Ramos MD      Indications and Patient Condition  Indications for airway management: anesthesia  Sedation level: deep  Preoxygenated: yes  Patient position: sniffing  MILS maintained throughout  Mask difficulty assessment: 0 - not attempted    Final Airway Details  Final airway type: endotracheal airway      Successful airway: ETT  Cuffed: yes   Successful intubation technique: Video laryngoscopy  Endotracheal tube insertion site: oral  Blade: GlideScope  Blade size: #3  ETT size (mm): 7.5    Cormack-Lehane Classification: grade I - full view of glottis  Placement verified by: capnometry   Measured from: teeth  ETT to teeth (cm): 21  Number of attempts at approach: 1

## 2024-10-07 NOTE — ANESTHESIA POSTPROCEDURE EVALUATION
Patient: Anthony Cantu    Procedure Summary       Date: 10/07/24 Room / Location: [facility] MAIN OR  / [facility] MAIN OR    Anesthesia Start: 0717 Anesthesia Stop: 0839    Procedure: Right L4-5 hemilaminectomy / foraminotomy (Spine Lumbar) Diagnosis: (L4-5 degenerative spondylolisthesis, lumbar radiculopathy)    Surgeons: Johnson Engel MD Anesthesiologist: Ema Ramos MD    Anesthesia Type: general ASA Status: 3            Anesthesia Type: No value filed.    Vitals Value Taken Time   /89 10/07/24 0833   Temp 98.1 °F (36.7 °C) 10/07/24 0833   Pulse 52 10/07/24 0842   Resp 13 10/07/24 0842   SpO2 98 % 10/07/24 0842   Vitals shown include unfiled device data.    [facility] AN Post Evaluation:   Patient Evaluated in PACU  Patient Participation: complete - patient participated  Level of Consciousness: awake and alert  Pain Score: 0  Pain Management: adequate  Airway Patency:patent  Yes    Nausea/Vomiting: none  Cardiovascular Status: acceptable  Respiratory Status: acceptable  Postoperative Hydration acceptable      Ema Ramos MD  10/7/2024 8:42 AM

## 2024-10-07 NOTE — OPERATIVE REPORT
PATIENT  Anthony Cantu    DATE OF SURGERY  10/7/2024    SURGEON   Johnson Engel MD    ASSISTANT  ANDREWS Miles    PREOPERATIVE DIAGNOSIS   L4-5 degenerative spondylolisthesis with spinal stenosis  Right lumbar radiculopathy    POSTOPERATIVE DIAGNOSIS   Same    PROCEDURES   1. L4-5 hemilaminotomy, foraminotomy from a right sided approach under direct visualization.   2. Use of fluoroscopy.   3. Use of intra-operative microscope.     DESCRIPTION OF PROCEDURE   Bilateral thecal sac decompression was performed. Exiting nerve roots were identified.     ANESTHESIA   General endotracheal    COMPLICATIONS   None.     BLOOD LOSS   Minimal.     INDICATIONS   The patient is a 68 year-old male with right lumbar radiculopathy with L4-5 spinal stenosis. The patient does have a degenerative spondylolisthesis at 4-5, however she has minimal to no low back pain and mainly neurological symptoms. The patient understood the risks of decompression versus decompression with lumbar fusion. The patient consented to the decompression procedure. She understood the risks and benefits, including risks of failure to improve, neurologic deterioration, infection, durotomy, continued low back pain, and progression of her spondylolisthesis were explained to the patient at length. The patient also understood the risks of anesthesia which include possible stroke, MI, death.       TECHNIQUE   The patient was brought to the operating room. General   anesthesia with endotracheal intubation was performed. The patient was positioned prone on the Vikas spinal frame. Care was taken to ensure bony prominences   were well padded. The lower back was prepped and draped in the   Usual sterile fashion. A surgical timeout was performed according to the WHO standards identifying the appropriate patient, surgical site, and surgical procedure.   Under fluoroscopy, an incision was made just off the   midline overlying the L4-5 interlaminar window. The  incision   continued through fascia with Bovie dissection. Sequential   dilators were advanced into the caudal edge of the L4 lamina   under fluoroscopy. A tubular retractor was then advanced and secured   to the operating table. Positioning of this was confirmed with   biplanar fluoroscopy.     The microscope was brought into the field. Soft tissue was   cleared off bony edges of the interlaminar window. Using a lin, the   caudal edge of the L4 lamina was removed down to the ligamentum flavum. Bilateral decompression was performed as the full extent of the flavum was identified. Partial medial facetectomies were performed. The   ligamentum flavum was then penetrated with a microcurette and   removed piecemeal. This facilitated visualization of the both sides of the thecal sac and the underlying nerve roots.     An angled hockey stick elevator was tracked underneath the thecal sac proximally and distally and ensured adequate decompression with no remaining fragments compressing the nerve root or the thecal sac. At this point, we were satisfied with the extent of the decompression, the neural elements were seen to be free.     The wound was thoroughly irrigated and hemostasis was ensured.   Sterile solution was infiltrated.   Meticulous hemostasis was achieved.   Fascia was approximated with 0 Vicryl suture. Subcutaneous tissue   and skin was approximated with suture. Sterile dressing applied.   The patient returned to recovery in stable condition.     I was present for and performed the entire procedure.

## 2024-10-07 NOTE — ANESTHESIA PREPROCEDURE EVALUATION
Anesthesia PreOp Note    HPI:     Anthony Cantu is a 68 year old male who presents for preoperative consultation requested by: Johnson Engel MD    Date of Surgery: 10/7/2024    Procedure(s):  L4-5 hemilaminectomy / foraminotomy  Indication: L4-5 degenerative spondylolisthesis, lumbar radiculopathy    Relevant Problems   No relevant active problems       NPO:  Last Liquid Consumption Date: 10/07/24  Last Liquid Consumption Time: 0415 (sips of water with meds)  Last Solid Consumption Date: 10/06/24  Last Solid Consumption Time: 2030  Last Liquid Consumption Date: 10/07/24          History Review:  Patient Active Problem List    Diagnosis Date Noted    Hyperlipidemia 10/01/2024    Lumbar stenosis 10/01/2024    Peyronie disease 06/13/2019    Acquired hypothyroidism 03/07/2013    Chronic allergic rhinitis 03/07/2013    Coronary atherosclerosis of native coronary artery 03/07/2013    Hypogonadism in male 03/07/2013    Obstructive sleep apnea syndrome 03/07/2013       Past Medical History:    Acute lower GI hemorrhage    post colonoscopy and polypectomy, recieved 2 blood trasfusions- no reaction    Allergic rhinitis    Back problem    C. difficile colitis    CAD (coronary artery disease)    s/p angioplasty and stent    Coronary atherosclerosis    COVID    Disorder of thyroid    Essential hypertension    Gastritis    History of blood transfusion    Hyperlipidemia    Hypogonadism male    Hypothyroidism    Lumbar stenosis    Peyronie's syndrome    Rheumatic fever    no valvular heart involment    Sleep apnea    uses CPAP       Past Surgical History:   Procedure Laterality Date    Angioplasty (coronary)      with 2 stents placed 2005, 2008    Cataract Bilateral     Colonoscopy  2014    with polypectomy    Lumbar transforaminal epidural injection (eeh)      2019, 2024    Repair retinal detach, cplx         Medications Prior to Admission   Medication Sig Dispense Refill Last Dose    ezetimibe 10 MG Oral Tab Take 1 tablet  (10 mg total) by mouth daily.   10/6/2024 at 1200    famotidine 40 MG Oral Tab Take 1 tablet (40 mg total) by mouth daily.   10/6/2024 at 1200    lisinopril 10 MG Oral Tab Take 1 tablet (10 mg total) by mouth daily.   10/6/2024 at 1000    levothyroxine 75 MCG Oral Tab Take 1 tablet (75 mcg total) by mouth before breakfast.   10/7/2024 at 0415    carvedilol 3.125 MG Oral Tab Take 1 tablet (3.125 mg total) by mouth 2 (two) times daily with meals.   10/7/2024 at 0415    pentoxifylline  MG Oral Tab CR Take 1 tablet (400 mg total) by mouth in the morning and 1 tablet (400 mg total) before bedtime.   9/30/2024    fluticasone propionate 50 MCG/ACT Nasal Suspension 1 spray by Each Nare route daily.   10/6/2024 at 1600    Testosterone 12.5 MG/ACT (1%) Transdermal Gel Place 4 Pump onto the skin daily.   9/25/2024    rosuvastatin 40 MG Oral Tab Take 1 tablet (40 mg total) by mouth daily.   10/6/2024 at 2000    ibuprofen 200 MG Oral Tab Take 2 tablets (400 mg total) by mouth every 6 (six) hours as needed for Pain.   9/23/2024    sildenafil 20 MG Oral Tab Take 1 tablet (20 mg total) by mouth as needed.   Past Month     Current Facility-Administered Medications Ordered in Epic   Medication Dose Route Frequency Provider Last Rate Last Admin    lactated ringers infusion   Intravenous Continuous Johnson Engel MD 20 mL/hr at 10/07/24 0641 New Bag at 10/07/24 0641    metoprolol tartrate (Lopressor) tab 25 mg  25 mg Oral Once PRN Johnson Engel MD        ceFAZolin (Ancef) 2g in 10mL IV syringe premix  2 g Intravenous Once Johnson Engel MD         No current [facility]-ordered outpatient medications on file.       Allergies   Allergen Reactions    Seasonal Runny nose    Clarithromycin INSOMNIA       Family History   Problem Relation Age of Onset    Other (Other) Brother         MI     Social History     Socioeconomic History    Marital status:    Tobacco Use    Smoking status: Never     Passive exposure: Past    Smokeless  tobacco: Never   Vaping Use    Vaping status: Never Used   Substance and Sexual Activity    Alcohol use: Yes     Comment: 10 drinks per week    Drug use: Never       Available pre-op labs reviewed.  Lab Results   Component Value Date    WBC 4.6 09/17/2024    RBC 5.25 09/17/2024    HGB 15.7 09/17/2024    HCT 47.1 09/17/2024    MCV 89.7 09/17/2024    MCH 29.9 09/17/2024    MCHC 33.3 09/17/2024    RDW 13.9 09/17/2024    .0 09/17/2024     Lab Results   Component Value Date     09/17/2024    K 4.5 09/17/2024     09/17/2024    CO2 29.0 09/17/2024    BUN 11 09/17/2024    CREATSERUM 1.02 09/17/2024    GLU 96 09/17/2024    CA 10.3 09/17/2024     Lab Results   Component Value Date    INR 0.96 09/17/2024       Vital Signs:  Body mass index is 33.8 kg/m².   height is 1.765 m (5' 9.5\") and weight is 105.3 kg (232 lb 3.2 oz). His oral temperature is 97.8 °F (36.6 °C). His pulse is 57. His respiration is 16 and oxygen saturation is 98%.   Vitals:    10/02/24 1530 10/07/24 0618   Pulse:  57   Resp:  16   Temp:  97.8 °F (36.6 °C)   TempSrc:  Oral   SpO2:  98%   Weight: 102.1 kg (225 lb) 105.3 kg (232 lb 3.2 oz)   Height: 1.765 m (5' 9.5\") 1.765 m (5' 9.5\")        Anesthesia Evaluation     Patient summary reviewed and Nursing notes reviewed    Airway   Mallampati: IV  TM distance: >3 FB  Neck ROM: full  Dental - Dentition appears grossly intact     Pulmonary - normal exam   (+) sleep apnea  Cardiovascular - normal exam  Exercise tolerance: good  (+) hypertension, CAD, CABG/stent    Neuro/Psych - negative ROS     GI/Hepatic/Renal - negative ROS     Endo/Other - negative ROS   Abdominal  - normal exam                 Anesthesia Plan:   ASA:  3  Plan:   General  Airway:  ETT      I have informed Anthony Cantu and/or legal guardian or family member of the nature of the anesthetic plan, benefits, risks including possible dental damage if relevant, major complications, and any alternative forms of anesthetic  management.   All of the patient's questions were answered to the best of my ability. The patient desires the anesthetic management as planned.  Ema Ramos MD  10/7/2024 7:01 AM  Present on Admission:   Acquired hypothyroidism   Chronic allergic rhinitis   Coronary atherosclerosis of native coronary artery   Hyperlipidemia   Hypogonadism in male   Lumbar stenosis   Peyronie disease   Obstructive sleep apnea syndrome

## 2024-10-07 NOTE — H&P
[facility]  part of [facility]    History & Physical    Anthony Cantu Patient Status:  Hospital Outpatient Surgery    10/14/1955 MRN [id number]   Location [facility] PRE OP RECOVERY Attending Johnson Engel MD   Hosp Day # 0 NEVAEH Gerard DO,      Date:  10/7/2024  Date of Admission:  10/7/2024    History:  Past Medical History:    Acute lower GI hemorrhage    post colonoscopy and polypectomy, recieved 2 blood trasfusions- no reaction    Allergic rhinitis    Back problem    C. difficile colitis    CAD (coronary artery disease)    s/p angioplasty and stent    Coronary atherosclerosis    COVID    Disorder of thyroid    Essential hypertension    Gastritis    History of blood transfusion    Hyperlipidemia    Hypogonadism male    Hypothyroidism    Lumbar stenosis    Peyronie's syndrome    Rheumatic fever    no valvular heart involment    Sleep apnea    uses CPAP     Past Surgical History:   Procedure Laterality Date    Angioplasty (coronary)      with 2 stents placed ,     Cataract Bilateral     Colonoscopy      with polypectomy    Lumbar transforaminal epidural injection (eeh)      2024    Repair retinal detach, cplx       Family History   Problem Relation Age of Onset    Other (Other) Brother         MI      reports that he has never smoked. He has been exposed to tobacco smoke. He has never used smokeless tobacco. He reports current alcohol use. He reports that he does not use drugs.    Allergies:  Allergies   Allergen Reactions    Seasonal Runny nose    Clarithromycin INSOMNIA       Home Medications:  Medications Prior to Admission   Medication Sig Dispense Refill Last Dose    ezetimibe 10 MG Oral Tab Take 1 tablet (10 mg total) by mouth daily.   10/6/2024 at 1200    famotidine 40 MG Oral Tab Take 1 tablet (40 mg total) by mouth daily.   10/6/2024 at 1200    lisinopril 10 MG Oral Tab Take 1 tablet (10 mg total) by mouth daily.   10/6/2024 at 1000     levothyroxine 75 MCG Oral Tab Take 1 tablet (75 mcg total) by mouth before breakfast.   10/7/2024 at 0415    carvedilol 3.125 MG Oral Tab Take 1 tablet (3.125 mg total) by mouth 2 (two) times daily with meals.   10/7/2024 at 0415    pentoxifylline  MG Oral Tab CR Take 1 tablet (400 mg total) by mouth in the morning and 1 tablet (400 mg total) before bedtime.   9/30/2024    fluticasone propionate 50 MCG/ACT Nasal Suspension 1 spray by Each Nare route daily.   10/6/2024 at 1600    Testosterone 12.5 MG/ACT (1%) Transdermal Gel Place 4 Pump onto the skin daily.   9/25/2024    rosuvastatin 40 MG Oral Tab Take 1 tablet (40 mg total) by mouth daily.   10/6/2024 at 2000    ibuprofen 200 MG Oral Tab Take 2 tablets (400 mg total) by mouth every 6 (six) hours as needed for Pain.   9/23/2024    sildenafil 20 MG Oral Tab Take 1 tablet (20 mg total) by mouth as needed.   Past Month       Review of Systems:  12 point ROS reviewed without pertinent positives.     HPI: The patient presents with complaints of low back pain with radiculopathy. The pain radiates from the low back to the right buttock/hamstrings with occasional pain down the lateral calf. He denies prior lumbar spine surgery.  They deny current fevers, chills, infection, rashes/bruises on the body, gross bowel/bladder incontinence or saddle anesthesia.     Physical Exam:  The patient is well developed, no acute distress, alert and oriented x3, skin intact to the posterior lumbar without erythema or edema, motor exam with 4-/5 EHL/tib ant on the right otherwise 5/5 bilateral lower extremities, calves soft and non tender, 2+DP      Assessment:  Patient Active Problem List   Diagnosis    Acquired hypothyroidism    Chronic allergic rhinitis    Coronary atherosclerosis of native coronary artery    Hyperlipidemia    Hypogonadism in male    Lumbar stenosis    Peyronie disease    Obstructive sleep apnea syndrome     Lumbar stenosis, right lumbar  radiculopathy    Plan:  Right L4-5 hemilaminotomy, foraminotomy      Vonnie Robertson PA-C with Johnson Engel MD  10/7/2024  6:51 AM

## (undated) DEVICE — GLOVE SUR 7 SENSICARE PI MIC PIP CRM PWD F

## (undated) DEVICE — KIT HEMSTAT MTRX 8ML PORCINE GEL HUM THROM

## (undated) DEVICE — TUBING MEGADYNE SPECULUM

## (undated) DEVICE — PENCIL ES BTTN SWCH W/ TIP HOLSTER E-Z CLN

## (undated) DEVICE — SUT MCRYL 3-0 18IN PS-2 ABSRB UD 19MM 3/8 CIR

## (undated) DEVICE — SPONGE 4X4 10PK

## (undated) DEVICE — GLOVE SUR 8.5 SENSICARE PI MIC PIP CRM PWD F

## (undated) DEVICE — ANTIBACTERIAL UNDYED BRAIDED (POLYGLACTIN 910), SYNTHETIC ABSORBABLE SUTURE: Brand: COATED VICRYL

## (undated) DEVICE — SPK10329 JACKSON KIT: Brand: SPK10329 JACKSON KIT

## (undated) DEVICE — GOWN,SIRUS,FAB REINF,RAGLAN,L,STERILE: Brand: MEDLINE

## (undated) DEVICE — GLOVE SUR 6.5 SENSICARE PI PIP GRN PWD F

## (undated) DEVICE — NON-ADHERENT DRESSING: Brand: TELFA

## (undated) DEVICE — SUT COAT VCRL+ 0 27IN UR-6 ABSRB VLT ANTIBACT

## (undated) DEVICE — GLOVE SUR 6.5 SENSICARE PI MIC PIP CRM PWD F

## (undated) DEVICE — ADHESIVE SKIN TOP FOR WND CLSR DERMBND ADV

## (undated) DEVICE — PACK CDS LAMINECTOMY

## (undated) DEVICE — WRAP COOLING BACK W/NO PILLOW

## (undated) DEVICE — GLOVE SUR 7 SENSICARE PI PIP GRN PWD F

## (undated) DEVICE — E-Z CLEAN, NON-STICK, PTFE COATED, ELECTROSURGICAL BLADE ELECTRODE, MODIFIED EXTENDED INSULATION, 6.5 INCH (16.5 CM): Brand: MEGADYNE

## (undated) DEVICE — 3M™ TEGADERM™ TRANSPARENT FILM DRESSING FRAME STYLE, 1626W, 4 IN X 4-3/4 IN (10 CM X 12 CM), 50/CT 4CT/CASE: Brand: 3M™ TEGADERM™

## (undated) DEVICE — 3M™ STERI-DRAPE™ U-DRAPE 1015: Brand: STERI-DRAPE™

## (undated) DEVICE — INTENDED USE FOR SURGICAL MARKING ON INTACT SKIN, ALSO PROVIDES A PERMANENT METHOD OF IDENTIFYING OBJECTS IN THE OPERATING ROOM: Brand: WRITESITE® PLUS MINI PREP RESISTANT MARKER

## (undated) DEVICE — C-ARMOR C-ARM EQUIPMENT COVERS CLEAR STERILE UNIVERSAL FIT 12 PER CASE: Brand: C-ARMOR

## (undated) DEVICE — 3.0MM PRECISION NEURO (MATCH HEAD)